# Patient Record
Sex: FEMALE | Race: WHITE | NOT HISPANIC OR LATINO | URBAN - METROPOLITAN AREA
[De-identification: names, ages, dates, MRNs, and addresses within clinical notes are randomized per-mention and may not be internally consistent; named-entity substitution may affect disease eponyms.]

---

## 2024-12-30 ENCOUNTER — APPOINTMENT (OUTPATIENT)
Dept: PRIMARY CARE | Facility: CLINIC | Age: 29
End: 2024-12-30

## 2025-01-30 ENCOUNTER — APPOINTMENT (OUTPATIENT)
Dept: PRIMARY CARE | Facility: CLINIC | Age: 30
End: 2025-01-30

## 2025-04-14 ENCOUNTER — APPOINTMENT (OUTPATIENT)
Dept: OBSTETRICS AND GYNECOLOGY | Facility: CLINIC | Age: 30
End: 2025-04-14
Payer: COMMERCIAL

## 2025-04-14 VITALS
SYSTOLIC BLOOD PRESSURE: 124 MMHG | DIASTOLIC BLOOD PRESSURE: 72 MMHG | WEIGHT: 214 LBS | BODY MASS INDEX: 42.01 KG/M2 | HEIGHT: 60 IN

## 2025-04-14 DIAGNOSIS — Z30.011 ENCOUNTER FOR INITIAL PRESCRIPTION OF CONTRACEPTIVE PILLS: ICD-10-CM

## 2025-04-14 DIAGNOSIS — Z30.432 ENCOUNTER FOR IUD REMOVAL: ICD-10-CM

## 2025-04-14 DIAGNOSIS — Z01.419 WOMEN'S ANNUAL ROUTINE GYNECOLOGICAL EXAMINATION: Primary | ICD-10-CM

## 2025-04-14 RX ORDER — NORETHINDRONE 0.35 MG/1
1 TABLET ORAL DAILY
Qty: 28 TABLET | Refills: 11 | Status: SHIPPED | OUTPATIENT
Start: 2025-04-14 | End: 2026-04-14

## 2025-04-14 RX ORDER — ESCITALOPRAM OXALATE 20 MG/1
1 TABLET ORAL DAILY
COMMUNITY

## 2025-04-14 RX ORDER — ARIPIPRAZOLE 15 MG/1
0.5 TABLET ORAL NIGHTLY
COMMUNITY
Start: 2025-01-07

## 2025-04-14 ASSESSMENT — ENCOUNTER SYMPTOMS
RESPIRATORY NEGATIVE: 1
PSYCHIATRIC NEGATIVE: 1
CONSTITUTIONAL NEGATIVE: 1
GASTROINTESTINAL NEGATIVE: 1

## 2025-04-14 NOTE — PROGRESS NOTES
Patient ID: Madie Hanson is a 29 y.o. female.    IUD Removal    Performed by: XIOMARA Shaw  Authorized by: XIOMARA Shaw    Procedure: IUD removal    Consent obtained by patient, parent, or legal power of  - including discussion of procedure risks and benefits, patient questions answered, and patient education provided: yes    Reason for removal: patient request    Strings visualized: no    Cervix cleaned with: iodopovidone    Tenaculum applied to cervix: yes    IUD grasped by forceps: yes    Performed with ultrasound guidance: no    IUD removed: yes    Date/Time of Removal:  4/14/2025 4:38 PM  Removed without complications: yes    IUD intact: yes    Cervix manually dilated: no    Subjective   Patient ID: Madie Hanson is a 29 y.o. female who presents for iud removal.  29 year old here for annual exam with desire for the IUD to be removed and a new birth control.  She is due for her pap today.  She denies any abnormal paps in the past.  She denies any abnormal bleeding, pain, discharge, urinary changes and breast complaints.         Review of Systems   Constitutional: Negative.    Respiratory: Negative.     Gastrointestinal: Negative.    Genitourinary: Negative.    Skin: Negative.    Psychiatric/Behavioral: Negative.         Objective   Physical Exam  Vitals reviewed.   Constitutional:       Appearance: Normal appearance. She is well-developed.   Pulmonary:      Effort: Pulmonary effort is normal. No respiratory distress.   Chest:   Breasts:     Breasts are symmetrical.      Right: Normal. No swelling, bleeding, inverted nipple, mass, nipple discharge, skin change or tenderness.      Left: Normal. No swelling, bleeding, inverted nipple, mass, nipple discharge, skin change or tenderness.   Abdominal:      Palpations: Abdomen is soft.   Genitourinary:     General: Normal vulva.      Exam position: Lithotomy position.      Pubic Area: No rash.       Labia:         Right: No rash,  tenderness, lesion or injury.         Left: No rash, tenderness, lesion or injury.       Urethra: No prolapse, urethral pain, urethral swelling or urethral lesion.      Vagina: Normal.      Cervix: Normal.      Uterus: Normal.       Adnexa: Right adnexa normal and left adnexa normal.      Rectum: Normal.      Comments: Pap/hpv reflex sent  IUD strings not visualized  Musculoskeletal:         General: Normal range of motion.   Lymphadenopathy:      Upper Body:      Right upper body: No supraclavicular, axillary or pectoral adenopathy.      Left upper body: No supraclavicular, axillary or pectoral adenopathy.   Skin:     General: Skin is warm and dry.   Neurological:      General: No focal deficit present.      Mental Status: She is alert and oriented to person, place, and time. Mental status is at baseline.   Psychiatric:         Attention and Perception: Attention and perception normal.         Mood and Affect: Mood and affect normal.         Speech: Speech normal.         Behavior: Behavior normal. Behavior is cooperative.         Thought Content: Thought content normal.         Judgment: Judgment normal.         Assessment/Plan   Problem List Items Addressed This Visit    None  Visit Diagnoses         Codes    Women's annual routine gynecological examination    -  Primary Z01.419    Encounter for initial prescription of contraceptive pills     Z30.011    Relevant Medications    norethindrone (Micronor) 0.35 mg tablet    Encounter for IUD removal     Z30.432        Pap/hpv sent  IUD removed  Micronor ordered with refills x 1 year  Follow up 1 year for annual exam, sooner as needed if problems arise         LENNIE Shaw-CNP 04/14/25 4:37 PM

## 2025-04-24 LAB
CYTOLOGY CMNT CVX/VAG CYTO-IMP: NORMAL
LAB AP HPV GENOTYPE QUESTION: YES
LAB AP HPV HR: NORMAL
LABORATORY COMMENT REPORT: NORMAL
PATH REPORT.TOTAL CANCER: NORMAL

## 2025-05-13 ENCOUNTER — APPOINTMENT (OUTPATIENT)
Dept: PRIMARY CARE | Facility: CLINIC | Age: 30
End: 2025-05-13

## 2025-07-09 ENCOUNTER — HOSPITAL ENCOUNTER (EMERGENCY)
Facility: HOSPITAL | Age: 30
Discharge: HOME | End: 2025-07-09
Attending: EMERGENCY MEDICINE
Payer: COMMERCIAL

## 2025-07-09 ENCOUNTER — APPOINTMENT (OUTPATIENT)
Dept: RADIOLOGY | Facility: HOSPITAL | Age: 30
End: 2025-07-09
Payer: COMMERCIAL

## 2025-07-09 VITALS
TEMPERATURE: 99.1 F | SYSTOLIC BLOOD PRESSURE: 131 MMHG | BODY MASS INDEX: 43.19 KG/M2 | OXYGEN SATURATION: 99 % | WEIGHT: 220 LBS | HEIGHT: 60 IN | HEART RATE: 90 BPM | DIASTOLIC BLOOD PRESSURE: 70 MMHG | RESPIRATION RATE: 16 BRPM

## 2025-07-09 DIAGNOSIS — R19.7 DIARRHEA, UNSPECIFIED TYPE: Primary | ICD-10-CM

## 2025-07-09 DIAGNOSIS — R11.0 NAUSEA: ICD-10-CM

## 2025-07-09 LAB
ABO GROUP (TYPE) IN BLOOD: NORMAL
ABO GROUP (TYPE) IN BLOOD: NORMAL
ALBUMIN SERPL BCP-MCNC: 4 G/DL (ref 3.4–5)
ALP SERPL-CCNC: 56 U/L (ref 33–110)
ALT SERPL W P-5'-P-CCNC: 25 U/L (ref 7–45)
ANION GAP SERPL CALC-SCNC: 9 MMOL/L (ref 10–20)
ANTIBODY SCREEN: NORMAL
APPEARANCE UR: CLEAR
AST SERPL W P-5'-P-CCNC: 21 U/L (ref 9–39)
B-HCG SERPL-ACNC: 6874 MIU/ML
BASOPHILS # BLD AUTO: 0.01 X10*3/UL (ref 0–0.1)
BASOPHILS NFR BLD AUTO: 0.2 %
BILIRUB SERPL-MCNC: 1.4 MG/DL (ref 0–1.2)
BILIRUB UR STRIP.AUTO-MCNC: NEGATIVE MG/DL
BUN SERPL-MCNC: 7 MG/DL (ref 6–23)
CALCIUM SERPL-MCNC: 8.6 MG/DL (ref 8.6–10.3)
CHLORIDE SERPL-SCNC: 105 MMOL/L (ref 98–107)
CO2 SERPL-SCNC: 24 MMOL/L (ref 21–32)
COLOR UR: YELLOW
CREAT SERPL-MCNC: 0.68 MG/DL (ref 0.5–1.05)
EGFRCR SERPLBLD CKD-EPI 2021: >90 ML/MIN/1.73M*2
EOSINOPHIL # BLD AUTO: 0.02 X10*3/UL (ref 0–0.7)
EOSINOPHIL NFR BLD AUTO: 0.3 %
ERYTHROCYTE [DISTWIDTH] IN BLOOD BY AUTOMATED COUNT: 12.5 % (ref 11.5–14.5)
GLUCOSE SERPL-MCNC: 100 MG/DL (ref 74–99)
GLUCOSE UR STRIP.AUTO-MCNC: NORMAL MG/DL
HCT VFR BLD AUTO: 37.5 % (ref 36–46)
HGB BLD-MCNC: 12.8 G/DL (ref 12–16)
IMM GRANULOCYTES # BLD AUTO: 0.02 X10*3/UL (ref 0–0.7)
IMM GRANULOCYTES NFR BLD AUTO: 0.3 % (ref 0–0.9)
KETONES UR STRIP.AUTO-MCNC: ABNORMAL MG/DL
LEUKOCYTE ESTERASE UR QL STRIP.AUTO: NEGATIVE
LYMPHOCYTES # BLD AUTO: 0.71 X10*3/UL (ref 1.2–4.8)
LYMPHOCYTES NFR BLD AUTO: 11 %
MCH RBC QN AUTO: 29.8 PG (ref 26–34)
MCHC RBC AUTO-ENTMCNC: 34.1 G/DL (ref 32–36)
MCV RBC AUTO: 87 FL (ref 80–100)
MONOCYTES # BLD AUTO: 0.42 X10*3/UL (ref 0.1–1)
MONOCYTES NFR BLD AUTO: 6.5 %
NEUTROPHILS # BLD AUTO: 5.28 X10*3/UL (ref 1.2–7.7)
NEUTROPHILS NFR BLD AUTO: 81.7 %
NITRITE UR QL STRIP.AUTO: NEGATIVE
NRBC BLD-RTO: 0 /100 WBCS (ref 0–0)
PH UR STRIP.AUTO: 5.5 [PH]
PLATELET # BLD AUTO: 249 X10*3/UL (ref 150–450)
POTASSIUM SERPL-SCNC: 3.6 MMOL/L (ref 3.5–5.3)
PROT SERPL-MCNC: 7 G/DL (ref 6.4–8.2)
PROT UR STRIP.AUTO-MCNC: NEGATIVE MG/DL
RBC # BLD AUTO: 4.3 X10*6/UL (ref 4–5.2)
RBC # UR STRIP.AUTO: NEGATIVE MG/DL
RH FACTOR (ANTIGEN D): NORMAL
RH FACTOR (ANTIGEN D): NORMAL
SODIUM SERPL-SCNC: 134 MMOL/L (ref 136–145)
SP GR UR STRIP.AUTO: 1.01
UROBILINOGEN UR STRIP.AUTO-MCNC: NORMAL MG/DL
WBC # BLD AUTO: 6.5 X10*3/UL (ref 4.4–11.3)

## 2025-07-09 PROCEDURE — 36415 COLL VENOUS BLD VENIPUNCTURE: CPT | Performed by: EMERGENCY MEDICINE

## 2025-07-09 PROCEDURE — 85025 COMPLETE CBC W/AUTO DIFF WBC: CPT | Performed by: EMERGENCY MEDICINE

## 2025-07-09 PROCEDURE — 80053 COMPREHEN METABOLIC PANEL: CPT | Performed by: EMERGENCY MEDICINE

## 2025-07-09 PROCEDURE — 76801 OB US < 14 WKS SINGLE FETUS: CPT

## 2025-07-09 PROCEDURE — 76817 TRANSVAGINAL US OBSTETRIC: CPT | Performed by: RADIOLOGY

## 2025-07-09 PROCEDURE — 81003 URINALYSIS AUTO W/O SCOPE: CPT | Performed by: EMERGENCY MEDICINE

## 2025-07-09 PROCEDURE — 99284 EMERGENCY DEPT VISIT MOD MDM: CPT | Mod: 25 | Performed by: EMERGENCY MEDICINE

## 2025-07-09 PROCEDURE — 76801 OB US < 14 WKS SINGLE FETUS: CPT | Performed by: RADIOLOGY

## 2025-07-09 PROCEDURE — 2500000004 HC RX 250 GENERAL PHARMACY W/ HCPCS (ALT 636 FOR OP/ED): Performed by: EMERGENCY MEDICINE

## 2025-07-09 PROCEDURE — 96374 THER/PROPH/DIAG INJ IV PUSH: CPT

## 2025-07-09 PROCEDURE — 86850 RBC ANTIBODY SCREEN: CPT | Performed by: EMERGENCY MEDICINE

## 2025-07-09 PROCEDURE — 84702 CHORIONIC GONADOTROPIN TEST: CPT | Performed by: EMERGENCY MEDICINE

## 2025-07-09 RX ORDER — ONDANSETRON HYDROCHLORIDE 2 MG/ML
4 INJECTION, SOLUTION INTRAVENOUS ONCE
Status: COMPLETED | OUTPATIENT
Start: 2025-07-09 | End: 2025-07-09

## 2025-07-09 RX ORDER — ONDANSETRON 4 MG/1
4 TABLET, FILM COATED ORAL EVERY 8 HOURS PRN
Qty: 12 TABLET | Refills: 0 | Status: SHIPPED | OUTPATIENT
Start: 2025-07-09

## 2025-07-09 RX ADMIN — ONDANSETRON 4 MG: 2 INJECTION, SOLUTION INTRAMUSCULAR; INTRAVENOUS at 17:51

## 2025-07-09 ASSESSMENT — PAIN - FUNCTIONAL ASSESSMENT: PAIN_FUNCTIONAL_ASSESSMENT: 0-10

## 2025-07-09 ASSESSMENT — PAIN DESCRIPTION - LOCATION: LOCATION: ABDOMEN

## 2025-07-09 ASSESSMENT — PAIN DESCRIPTION - DESCRIPTORS: DESCRIPTORS: CRAMPING

## 2025-07-09 ASSESSMENT — PAIN SCALES - GENERAL: PAINLEVEL_OUTOF10: 5 - MODERATE PAIN

## 2025-07-09 NOTE — Clinical Note
Madie Hanson was seen and treated in our emergency department on 7/9/2025.  She may return to work on 07/11/2025.       If you have any questions or concerns, please don't hesitate to call.      Pedro Glass, DO

## 2025-07-09 NOTE — ED PROVIDER NOTES
HPI   Chief Complaint   Patient presents with    Abdominal Cramping     6 weeks pregnant    Vomiting    Dizziness       30-year-old female presenting with abdominal cramping.  She states he is about 6 weeks pregnant.  She has not had a confirmatory ultrasound as of yet.  She states she has a scheduled appointment.  She states that last night she was eating at [employer] and about snf through her burger she states something was not feeling right.  She went home and did not eat anymore.  She has not had any nausea or vomiting, but she has several episodes of diarrhea.  No black or bloody stools.  No fevers or chills.  No vaginal bleeding or loss of fluid.  Type and Rh will be obtained as well as serum quant hCG.              Patient History   Medical History[1]  Surgical History[2]  Family History[3]  Social History[4]    Physical Exam   ED Triage Vitals [07/09/25 1713]   Temperature Heart Rate Respirations BP   37.3 °C (99.1 °F) (!) 104 18 136/79      Pulse Ox Temp src Heart Rate Source Patient Position   100 % -- -- --      BP Location FiO2 (%)     -- --       Physical Exam  Vitals and nursing note reviewed.   Constitutional:       Appearance: Normal appearance.   HENT:      Head: Normocephalic.      Mouth/Throat:      Mouth: Mucous membranes are moist.   Eyes:      Extraocular Movements: Extraocular movements intact.      Pupils: Pupils are equal, round, and reactive to light.   Cardiovascular:      Rate and Rhythm: Normal rate and regular rhythm.   Pulmonary:      Effort: Pulmonary effort is normal.   Abdominal:      Palpations: Abdomen is soft.   Skin:     General: Skin is warm and dry.   Neurological:      General: No focal deficit present.      Mental Status: She is alert.   Psychiatric:         Mood and Affect: Mood normal.           ED Course & MDM   Diagnoses as of 07/09/25 2111   Diarrhea, unspecified type   Nausea                 No data recorded     Korin Coma Scale Score: 15 (07/09/25 1712 :  Sonja Taylor RN)                           Medical Decision Making  Patient presenting with abdominal cramping and diarrhea at 6 weeks pregnant.  She is not had a confirmatory intrauterine pregnancy.  Differential includes but is not limited to trimester pregnancy, ectopic pregnancy, colitis, food poisoning.  IV line was established.  Patient given a liter normal saline as well as Zofran 4 mg IV.  CBC will be obtained assess white blood cell count, hemoglobin and platelets.  CMP to assess liver function, renal function electrolytes, glucose.  CBC shows normal white blood cell count 6.5.  Hemoglobin and hematocrit are stable.  Platelets are normal.  Renal function is normal limits.  Glucose 100.  Sodium slightly low at 124.  Patient is O+ therefore she does not need RhoGAM.  hCG is 6874.  Urinalysis is negative for infection.  Ultrasound shows small cystic areas within the endometrium which may represent an early gestational sac however there is no yolk sac or fetal pole.  Recommend follow-up pelvic ultrasound in 7 to 10 days.  This was discussed with the patient.  She feels comfortable going home at this point.  She will follow-up with her OB/GYN.  She was given Zofran for nausea.        Procedure  Procedures         [1]   Past Medical History:  Diagnosis Date    Depression 2020   [2]   Past Surgical History:  Procedure Laterality Date    APPENDECTOMY  2021     SECTION, LOW TRANSVERSE  2020    CHOLECYSTECTOMY  2014   [3]   Family History  Problem Relation Name Age of Onset    Alcohol abuse Mother Kit brower     Asthma Mother Kit brower     Depression Mother Kit brower     Hernia Mother Kit brower     Alcohol abuse Father Rubén brower     Cancer Father Rubén brower     Hearing loss Father Rubén brower     Hypertension Father Rubén brower    [4]   Social History  Tobacco Use    Smoking status: Never    Smokeless tobacco: Never   Substance Use Topics    Alcohol use: Not Currently      Alcohol/week: 1.0 standard drink of alcohol     Types: 1 Cans of beer per week    Drug use: Never        Pedro Glass DO  07/09/25 1212

## 2025-07-10 LAB — HOLD SPECIMEN: NORMAL

## 2025-08-13 ENCOUNTER — APPOINTMENT (OUTPATIENT)
Dept: OBSTETRICS AND GYNECOLOGY | Facility: CLINIC | Age: 30
End: 2025-08-13
Payer: COMMERCIAL

## 2025-10-06 ENCOUNTER — APPOINTMENT (OUTPATIENT)
Dept: PRIMARY CARE | Facility: CLINIC | Age: 30
End: 2025-10-06
Payer: COMMERCIAL